# Patient Record
(demographics unavailable — no encounter records)

---

## 2024-10-08 NOTE — HEALTH RISK ASSESSMENT
[1 or 2 (0 pts)] : 1 or 2 (0 points) [Never (0 pts)] : Never (0 points) [No] : In the past 12 months have you used drugs other than those required for medical reasons? No [No falls in past year] : Patient reported no falls in the past year [0] : 2) Feeling down, depressed, or hopeless: Not at all (0) [PHQ-2 Negative - No further assessment needed] : PHQ-2 Negative - No further assessment needed [With Patient/Caregiver] : , with patient/caregiver [Reviewed no changes] : Reviewed, no changes [Aggressive treatment] : aggressive treatment [Never] : Never [Audit-CScore] : 0 [de-identified] : stretching [de-identified] : regular [[id number]] : 0 [AdvancecareDate] : 12/23

## 2024-10-08 NOTE — HISTORY OF PRESENT ILLNESS
[FreeTextEntry1] : Follow up appointment [de-identified] : 76-year-old male originally from [facility] with past medical history of diabetes type 2, hypertension, vitamin D insufficiency, benign paroxysmal positional vertigo, non-alcoholic fatty liver disease, GERD, presented to the office stating that a nurse from the insurance came to his house to do an evaluation and was found with orthostatic Hypotension (129/79 sitting, 102/71 standing). Pt has h/o vertigo and has been having episodic bouts of vertigo.

## 2024-10-08 NOTE — ASSESSMENT
[FreeTextEntry1] : 76-year-old male originally from [facility] with past medical history of diabetes type 2, hypertension, vitamin D insufficiency, benign paroxysmal positional vertigo, non-alcoholic fatty liver disease, GERD, presented to the office for diabetes check, med refills and lightheadedness / Vertigo.  ENT / Neuro: h/o vertigo, lightheadedness -Continue Meclizine to 50 mg TID prn -Start Azelastine and Fluticasone -Head CT ordered, needs PA  Endocrine: History of diabetes type 2, erectile dysfunction, elevated prolactin level. -MRI Brain unremarkable (11/18) -Continue Sildenafil 5 mg daily, e-refilled -Patient's last A1c was 6.6 --> 6.5 --> 6.7 --> 7.0 --> 7.6 --> 9.0 --> 7.4 --> 7.0 POCT today. -Continue Pioglitazone - Metformin 15/500 qd, Januvia 50 mg daily, all e-refilled to mail order pharmacy. -Diet and exercise  Cardiovascular: History of hypertension, hyperlipidemia. -Blood pressure at goal -Will discontinue HTZ in lieu of vertigo / dizziness, will start on Lisinopril 2.5 mg for renal protection. Continue simvastatin 10 mg at bedtime. -Compression stockings recommended for dependent edema. -Patient's cardiologist is Dr. Hossein Samuels, seen within the last month.  GI: h/o Colonic polyps -Recent Colonoscopy 2020 with Dr Aaron, f/w colonic polyps.  GI: History of GERD. -Continue pantoprazole 20 mg daily.  : h/o BPH, elevated PSA -Continue Tamsulosin qd, Finasteride, Tadalafil. -Follow up with Dr Billings  F./E./N.: Vitamin D insufficiency. -Continue Vit. D. 2000 u/day.  Healthcare maintenance: -Covid vaccine PFIZER 4/29/21, 5/20/21, 12/17/21. -Flu Vaccine Sept 30/2024 at local pharmacy -Pneumovax vaccine in Jan 2020 -Ophthalmology 11/23 with Dr Vasquez -Patient's last bone density was done in Jan 2020, with Normal density. -Reports shingles vaccine x 1 dose 2022 -Colonoscopy done in July 2020, colonic polyps. -Pt states he completed Covid vaccination x 3 PFIZER doses -Dexa bone scan 12/22, Normal.

## 2025-03-26 NOTE — HISTORY OF PRESENT ILLNESS
[FreeTextEntry1] : Follow up appointment for medication refills [de-identified] : 76-year-old male originally from [facility] with past medical history of diabetes type 2, hypertension, vitamin D insufficiency, benign paroxysmal positional vertigo, non-alcoholic fatty liver disease, GERD, presented to the office for medication refills and diabetes check

## 2025-03-26 NOTE — ASSESSMENT
[FreeTextEntry1] : 76-year-old male originally from [facility] with past medical history of diabetes type 2, hypertension, vitamin D insufficiency, benign paroxysmal positional vertigo, non-alcoholic fatty liver disease, GERD, presented to the office for diabetes check, med refills.  ENT / Neuro: h/o vertigo, lightheadedness -Continue Meclizine to 50 mg TID prn, e-refilled -Continue Azelastine and Fluticasone  Endocrine: History of diabetes type 2, erectile dysfunction, elevated prolactin level. -MRI Brain unremarkable (11/18) -Continue Sildenafil 5 mg daily, e-refilled -Patient's last A1c was 6.6 --> 6.5 --> 6.7 --> 7.0 --> 7.6 --> 9.0 --> 7.4 --> 7.0 --> 7.9 POCT today. -Continue Pioglitazone - Metformin 15/500 qd, Januvia 50 mg daily. -Pt admits to dietary indiscretions -Diet and exercise  Cardiovascular: History of hypertension, hyperlipidemia, orthostatic hypotension. -Blood pressure at goal. -Started on Valsartan-HCT by Cardiology Dr Samuels. Continue HCTZ 25 mg daily, Simvastatin 10 mg at bedtime. -Compression stockings recommended for dependent edema. -Patient's cardiologist is Dr. Hossein Samuels.  GI: h/o Colonic polyps -Recent Colonoscopy 2020 with Dr Aaron, f/w colonic polyps.  GI: History of GERD. -Continue pantoprazole 20 mg daily.  : h/o BPH, elevated PSA -Continue Tamsulosin qd, Finasteride, Tadalafil. -Follow up with Dr Billings  F./E./N.: Vitamin D insufficiency. -Continue Vit. D. 2000 u/day.  Healthcare maintenance: -Covid vaccine PFIZER 4/29/21, 5/20/21, 12/17/21. -Flu Vaccine Sept 30/2024 at local pharmacy -Pneumovax vaccine in Jan 2020 -Ophthalmology 11/23 with Dr Vasquez -Reports shingles vaccine x 1 dose 2022 -Colonoscopy done in July 2020, colonic polyps. -Pt states he completed Covid vaccination x 3 PFIZER doses -Dexa bone scan 12/22, Normal.

## 2025-03-26 NOTE — ASSESSMENT
[FreeTextEntry1] : 76-year-old male originally from [facility] with past medical history of diabetes type 2, hypertension, vitamin D insufficiency, benign paroxysmal positional vertigo, non-alcoholic fatty liver disease, GERD, presented to the office for diabetes check, med refills.  ENT / Neuro: h/o vertigo, lightheadedness -Continue Meclizine to 50 mg TID prn, e-refilled -Continue Azelastine and Fluticasone  Endocrine: History of diabetes type 2, erectile dysfunction, elevated prolactin level. -MRI Brain unremarkable (11/18) -Continue Sildenafil 5 mg daily, e-refilled -Patient's last A1c was 6.6 --> 6.5 --> 6.7 --> 7.0 --> 7.6 --> 9.0 --> 7.4 --> 7.0 --> 7.9 POCT today. -Continue Pioglitazone - Metformin 15/500 qd, Januvia 50 mg daily. -Pt admits to dietary indiscretions -Diet and exercise  Cardiovascular: History of hypertension, hyperlipidemia, orthostatic hypotension. -Blood pressure at goal. -Started on Valsartan-HCT by Cardiology Dr Samuels. Continue HCTZ 25 mg daily, Simvastatin 10 mg at bedtime. -Compression stockings recommended for dependent edema. -Patient's cardiologist is Dr. Hossein Samuels.  GI: h/o Colonic polyps -Recent Colonoscopy 2020 with Dr Aaron, f/w colonic polyps.  GI: History of GERD. -Continue pantoprazole 20 mg daily.  : h/o BPH, elevated PSA -Continue Tamsulosin qd, Finasteride, Tadalafil. -Follow up with Dr Billings  F./E./N.: Vitamin D insufficiency. -Continue Vit. D. 2000 u/day.  Healthcare maintenance: -Covid vaccine PFIZER 4/29/21, 5/20/21, 12/17/21. -Flu Vaccine Sept 30/2024 at local pharmacy -Pneumovax vaccine in Jan 2020 -Ophthalmology 11/23 with Dr Vasquez -Reports shingles vaccine x 1 dose 2022 -Colonoscopy done in July 2020, colonic polyps. -Pt states he completed Covid vaccination x 3 PFIZER doses -Dexa bone scan 12/22, Normal.

## 2025-03-26 NOTE — HEALTH RISK ASSESSMENT
[1 or 2 (0 pts)] : 1 or 2 (0 points) [Never (0 pts)] : Never (0 points) [No] : In the past 12 months have you used drugs other than those required for medical reasons? No [No falls in past year] : Patient reported no falls in the past year [0] : 2) Feeling down, depressed, or hopeless: Not at all (0) [PHQ-2 Negative - No further assessment needed] : PHQ-2 Negative - No further assessment needed [With Patient/Caregiver] : , with patient/caregiver [Reviewed no changes] : Reviewed, no changes [Aggressive treatment] : aggressive treatment [Never] : Never [Audit-CScore] : 0 [de-identified] : stretching [de-identified] : regular [[id number]] : 0 [AdvancecareDate] : 12/23

## 2025-03-26 NOTE — HEALTH RISK ASSESSMENT
[1 or 2 (0 pts)] : 1 or 2 (0 points) [Never (0 pts)] : Never (0 points) [No] : In the past 12 months have you used drugs other than those required for medical reasons? No [No falls in past year] : Patient reported no falls in the past year [0] : 2) Feeling down, depressed, or hopeless: Not at all (0) [PHQ-2 Negative - No further assessment needed] : PHQ-2 Negative - No further assessment needed [With Patient/Caregiver] : , with patient/caregiver [Reviewed no changes] : Reviewed, no changes [Aggressive treatment] : aggressive treatment [Never] : Never [Audit-CScore] : 0 [de-identified] : stretching [de-identified] : regular [[id number]] : 0 [AdvancecareDate] : 12/23

## 2025-03-26 NOTE — HISTORY OF PRESENT ILLNESS
[FreeTextEntry1] : Follow up appointment for medication refills [de-identified] : 76-[facility]-old [place] [facility] with past medical history of diabetes type 2, hypertension, vitamin D insufficiency, benign paroxysmal positional vertigo, non-alcoholic fatty liver disease, GERD, presented to the office for medication refills and diabetes check